# Patient Record
(demographics unavailable — no encounter records)

---

## 2024-10-15 NOTE — PHYSICAL EXAM
[No Acute Distress] : no acute distress [Normal Oropharynx] : normal oropharynx [Normal Appearance] : normal appearance [No Neck Mass] : no neck mass [Normal Rate/Rhythm] : normal rate/rhythm [Normal S1, S2] : normal s1, s2 [No Murmurs] : no murmurs [No Resp Distress] : no resp distress [Wheeze] : wheeze [No Abnormalities] : no abnormalities [No Clubbing] : no clubbing [No Cyanosis] : no cyanosis [No Edema] : no edema [FROM] : FROM [Normal Affect] : normal affect

## 2024-10-23 NOTE — DISCUSSION/SUMMARY
[FreeTextEntry1] : Environmental moderate persistent asthma with continued wheezing. Kenalog 60 mg im given right deltoid. Lot [id number] ex 6/26  Will order Ige, eosinophil count and prescribe Dupixent. Ret 1 month  May return to work 10/18/24

## 2024-10-23 NOTE — DISCUSSION/SUMMARY
[FreeTextEntry1] : Environmental moderate persistent asthma with continued wheezing. Kenalog 60 mg im given right deltoid. Lot [id number] ex 6/26  Will order Ige, eosinophil count and prescribe Dupixent. Ret 1 month  May return to work 10/18/24

## 2024-10-23 NOTE — HISTORY OF PRESENT ILLNESS
[Never] : never [TextBox_4] : 40 yo female with Asthma still has wheezing. Pft mod obstruction and mild restriction.

## 2024-12-01 NOTE — DISCUSSION/SUMMARY
[FreeTextEntry1] : 1. Acute exacerbation of moderate persistent asthma--> Kenalog IM given Left deltoid today w/ no complications. Start Prednisone 20 mg x 10 days, Breztri inhaler 2 puffs BID, and renewed albuterol inhaler as needed for rescue. 2. Pt to [facility] in 6 weeks for follow up, poss. Allergy BW then.  Advised to go to ED or urgent care if symptoms worsen.

## 2024-12-01 NOTE — REVIEW OF SYSTEMS
[Postnasal Drip] : postnasal drip [Sinus Problems] : sinus problems [Cough] : cough [Chest Tightness] : chest tightness [Sputum] : sputum [Wheezing] : wheezing [SOB on Exertion] : sob on exertion [Hay Fever] : hay fever [Seasonal Allergies] : seasonal allergies [Negative] : Musculoskeletal [Fever] : no fever [Chills] : no chills [Chest Discomfort] : no chest discomfort [Abdominal Pain] : no abdominal pain [Nausea] : no nausea [Vomiting] : no vomiting [Diarrhea] : no diarrhea [Constipation] : no constipation

## 2024-12-01 NOTE — HISTORY OF PRESENT ILLNESS
[TextBox_4] :  NEL OCAMPO 39 year old Female returns to clinic for follow up of Asthma. Pt c/o cough and wheezing x 2 weeks. Went to urgent care x 1 weeks ago, prescribed Prednisone 20 mg x 7 days and Ventolin inhaler. Symptoms have not improved. States h/o childhood asthma. C/o sinus and nasal congestion all year round.

## 2024-12-01 NOTE — PHYSICAL EXAM
[Turbinate hypertrophy] : turbinate hypertrophy [Normal Appearance] : normal appearance [No Neck Mass] : no neck mass [Normal Rate/Rhythm] : normal rate/rhythm [Normal S1, S2] : normal s1, s2 [No Murmurs] : no murmurs [Wheeze] : wheeze [Benign] : benign [Normal Gait] : normal gait [No Edema] : no edema [Oriented x3] : oriented x3 [Normal Affect] : normal affect [TextBox_2] : coughing, audible wheezing [TextBox_68] : wheezing all fileds